# Patient Record
Sex: MALE | Race: WHITE | NOT HISPANIC OR LATINO
[De-identification: names, ages, dates, MRNs, and addresses within clinical notes are randomized per-mention and may not be internally consistent; named-entity substitution may affect disease eponyms.]

---

## 2018-09-18 PROBLEM — Z00.00 ENCOUNTER FOR PREVENTIVE HEALTH EXAMINATION: Status: ACTIVE | Noted: 2018-09-18

## 2018-10-09 ENCOUNTER — TRANSCRIPTION ENCOUNTER (OUTPATIENT)
Age: 38
End: 2018-10-09

## 2018-10-09 ENCOUNTER — APPOINTMENT (OUTPATIENT)
Dept: HUMAN REPRODUCTION | Facility: CLINIC | Age: 38
End: 2018-10-09
Payer: COMMERCIAL

## 2018-10-09 PROCEDURE — 89322 SEMEN ANAL STRICT CRITERIA: CPT

## 2018-10-20 ENCOUNTER — APPOINTMENT (OUTPATIENT)
Dept: HUMAN REPRODUCTION | Facility: CLINIC | Age: 38
End: 2018-10-20
Payer: COMMERCIAL

## 2018-10-20 PROCEDURE — 36415 COLL VENOUS BLD VENIPUNCTURE: CPT

## 2018-10-22 ENCOUNTER — APPOINTMENT (OUTPATIENT)
Dept: HUMAN REPRODUCTION | Facility: CLINIC | Age: 38
End: 2018-10-22
Payer: COMMERCIAL

## 2018-10-22 PROCEDURE — 89322 SEMEN ANAL STRICT CRITERIA: CPT

## 2018-11-12 ENCOUNTER — APPOINTMENT (OUTPATIENT)
Dept: HUMAN REPRODUCTION | Facility: CLINIC | Age: 38
End: 2018-11-12

## 2019-01-17 ENCOUNTER — APPOINTMENT (OUTPATIENT)
Dept: HUMAN REPRODUCTION | Facility: CLINIC | Age: 39
End: 2019-01-17
Payer: COMMERCIAL

## 2019-01-17 PROCEDURE — 89259 CRYOPRESERVATION SPERM: CPT

## 2019-01-17 PROCEDURE — 89343 STORAGE/YEAR SPERM/SEMEN: CPT

## 2019-01-17 PROCEDURE — 89322 SEMEN ANAL STRICT CRITERIA: CPT

## 2019-02-05 PROCEDURE — 89261 SPERM ISOLATION COMPLEX: CPT

## 2021-04-27 ENCOUNTER — APPOINTMENT (OUTPATIENT)
Dept: HUMAN REPRODUCTION | Facility: CLINIC | Age: 41
End: 2021-04-27

## 2021-09-02 ENCOUNTER — APPOINTMENT (OUTPATIENT)
Dept: HUMAN REPRODUCTION | Facility: CLINIC | Age: 41
End: 2021-09-02

## 2021-09-29 ENCOUNTER — APPOINTMENT (OUTPATIENT)
Dept: UROLOGY | Facility: CLINIC | Age: 41
End: 2021-09-29
Payer: COMMERCIAL

## 2021-09-29 ENCOUNTER — NON-APPOINTMENT (OUTPATIENT)
Age: 41
End: 2021-09-29

## 2021-09-29 VITALS
SYSTOLIC BLOOD PRESSURE: 126 MMHG | DIASTOLIC BLOOD PRESSURE: 87 MMHG | HEIGHT: 75 IN | WEIGHT: 280 LBS | BODY MASS INDEX: 34.82 KG/M2 | HEART RATE: 64 BPM | TEMPERATURE: 97.5 F

## 2021-09-29 DIAGNOSIS — Z78.9 OTHER SPECIFIED HEALTH STATUS: ICD-10-CM

## 2021-09-29 DIAGNOSIS — I10 ESSENTIAL (PRIMARY) HYPERTENSION: ICD-10-CM

## 2021-09-29 DIAGNOSIS — Z82.49 FAMILY HISTORY OF ISCHEMIC HEART DISEASE AND OTHER DISEASES OF THE CIRCULATORY SYSTEM: ICD-10-CM

## 2021-09-29 PROCEDURE — 99204 OFFICE O/P NEW MOD 45 MIN: CPT

## 2021-09-29 NOTE — ASSESSMENT
[FreeTextEntry1] : Plan\par testosterone\par LH\par FSH\par scrotal US\par repeat semen analysis\par fu 2 weeks

## 2021-09-29 NOTE — HISTORY OF PRESENT ILLNESS
[FreeTextEntry1] : Mr. JEFFERSON ZNEG 41 year old  M  PMH HTN and no PSH. Pt comes in bc him and his wife are going through IVF. Last IVF had a low sperm count but succeeded about 2 years ago. Pt currently trying to get pregnant again. Erections are good.\par \par 9/14/21 motility 0%, sperm count <2.0 mill/ml morphology 2%.  \par

## 2021-09-29 NOTE — PHYSICAL EXAM
[General Appearance - Well Developed] : well developed [General Appearance - Well Nourished] : well nourished [Normal Appearance] : normal appearance [Well Groomed] : well groomed [General Appearance - In No Acute Distress] : no acute distress [Edema] : no peripheral edema [Respiration, Rhythm And Depth] : normal respiratory rhythm and effort [Exaggerated Use Of Accessory Muscles For Inspiration] : no accessory muscle use [Abdomen Soft] : soft [Abdomen Tenderness] : non-tender [Costovertebral Angle Tenderness] : no ~M costovertebral angle tenderness [Urethral Meatus] : meatus normal [Penis Abnormality] : normal circumcised penis [Urinary Bladder Findings] : the bladder was normal on palpation [Scrotum] : the scrotum was normal [Epididymis] : the epididymides were normal [Testes Tenderness] : no tenderness of the testes [Testes Mass (___cm)] : there were no testicular masses [Normal Station and Gait] : the gait and station were normal for the patient's age [] : no rash [No Focal Deficits] : no focal deficits [Oriented To Time, Place, And Person] : oriented to person, place, and time [Affect] : the affect was normal [Mood] : the mood was normal [Not Anxious] : not anxious

## 2021-10-01 ENCOUNTER — APPOINTMENT (OUTPATIENT)
Dept: ULTRASOUND IMAGING | Facility: CLINIC | Age: 41
End: 2021-10-01
Payer: COMMERCIAL

## 2021-10-01 ENCOUNTER — TRANSCRIPTION ENCOUNTER (OUTPATIENT)
Age: 41
End: 2021-10-01

## 2021-10-01 PROCEDURE — 76870 US EXAM SCROTUM: CPT

## 2021-10-07 ENCOUNTER — APPOINTMENT (OUTPATIENT)
Dept: UROLOGY | Facility: CLINIC | Age: 41
End: 2021-10-07
Payer: COMMERCIAL

## 2021-10-07 VITALS
BODY MASS INDEX: 34.82 KG/M2 | DIASTOLIC BLOOD PRESSURE: 86 MMHG | WEIGHT: 280 LBS | TEMPERATURE: 97.3 F | HEART RATE: 61 BPM | HEIGHT: 75 IN | SYSTOLIC BLOOD PRESSURE: 122 MMHG

## 2021-10-07 PROCEDURE — 99214 OFFICE O/P EST MOD 30 MIN: CPT

## 2021-10-07 NOTE — HISTORY OF PRESENT ILLNESS
[FreeTextEntry1] : Mr. JEFFERSON ZENG 41 year old  M  PMH HTN and no PSH. Pt comes in bc him and his wife are going through IVF. Last IVF had a low sperm count but succeeded about 2 years ago. Pt currently trying to get pregnant again. Erections are good.\par \par 9/14/21 motility 0%, sperm count <2.0 mill/ml morphology 2%.  \par 9/30/21 mootility 0%, sperm count 16.5 mill/ml, morphology 2%  \par              FSH 5.3\par              LH 8.4\par              Testosterone 379\par \par

## 2021-10-11 ENCOUNTER — APPOINTMENT (OUTPATIENT)
Dept: UROLOGY | Facility: CLINIC | Age: 41
End: 2021-10-11
Payer: COMMERCIAL

## 2021-10-11 VITALS
RESPIRATION RATE: 16 BRPM | HEIGHT: 75 IN | WEIGHT: 283 LBS | DIASTOLIC BLOOD PRESSURE: 85 MMHG | BODY MASS INDEX: 35.19 KG/M2 | OXYGEN SATURATION: 97 % | SYSTOLIC BLOOD PRESSURE: 131 MMHG | HEART RATE: 64 BPM

## 2021-10-11 DIAGNOSIS — N46.11 ORGANIC OLIGOSPERMIA: ICD-10-CM

## 2021-10-11 PROCEDURE — 36415 COLL VENOUS BLD VENIPUNCTURE: CPT

## 2021-10-11 PROCEDURE — 93976 VASCULAR STUDY: CPT

## 2021-10-11 PROCEDURE — 99214 OFFICE O/P EST MOD 30 MIN: CPT

## 2021-10-11 PROCEDURE — 76870 US EXAM SCROTUM: CPT

## 2021-10-11 NOTE — HISTORY OF PRESENT ILLNESS
[FreeTextEntry1] : JEFFERSON ZENG, a 41 year old male, presented to the office with the chief complaint of male infertility. \par \par Wife 32 y/o \par -Seeing Dr. Jerry \par -one viable pregnancy 2 y/o boy via IVF - first attempt \par -last IVF  was chemical pregnancy - miscarriage \par - had only two embryos brandi genetic abberantis\par \par Had low sperm count in the past and borderline motility \par \par Recent semen analysis conducted 2 semen analyses - 9/14/21 & 9/29/21 - both showed zero motility. \par \par No erectile problems \par \par Stable weight \par \par works as an  at home

## 2021-10-11 NOTE — PHYSICAL EXAM
[General Appearance - Well Developed] : well developed [General Appearance - Well Nourished] : well nourished [Normal Appearance] : normal appearance [Well Groomed] : well groomed [General Appearance - In No Acute Distress] : no acute distress [Abdomen Soft] : soft [Abdomen Tenderness] : non-tender [Costovertebral Angle Tenderness] : no ~M costovertebral angle tenderness [] : no respiratory distress [Respiration, Rhythm And Depth] : normal respiratory rhythm and effort [Exaggerated Use Of Accessory Muscles For Inspiration] : no accessory muscle use [Oriented To Time, Place, And Person] : oriented to person, place, and time [Affect] : the affect was normal [Mood] : the mood was normal [Not Anxious] : not anxious [Normal Station and Gait] : the gait and station were normal for the patient's age [No Focal Deficits] : no focal deficits [No Palpable Adenopathy] : no palpable adenopathy [Urethral Meatus] : meatus normal [Penis Abnormality] : normal circumcised penis [FreeTextEntry1] : bilateral variocele L testicular atrophy (5cc); Right = 10cc

## 2021-10-11 NOTE — ASSESSMENT
[FreeTextEntry1] : Male infertility \par -2 semen analyses both show zero motility \par asthenoteratooligospermia \par \par expand hormonal eval \par cytogenetic, CF, YCMD - severe oligospermia \par if low T - consider arimidex - obese hence clomid may lead to increase in E level \par \par bilateral testis atrophy \par bilateral large varicocele on US done in office - reviewed by me \par pt is difficult to examine \par \par \par discussed timing of recovery of spermatogenesis after any surgical or medical intervention \par his wife is young hence reasonable to give them some time \par \par I would recommend repair of varicocele ro improve semen parameters\par \par This patient has varicocele. \par Presence of varicocele is associated with infertility, testis atrophy, low testosterone, testicular pain and erectile dysfunction.\par I have discussed effects of varicocele on different aspects of health and disease. \par I reviewed different treatment options including not performing any treatment at all.\par Surgical and non-surgical therapy was discussed. \par Patient wanted to proceed with microsurgical repair. \par The procedure itself, its risks including but not limited to bleeding, infection, wound dehiscence and issues, scar, postoperative pain, postoperative hydrocele, lack of improvement of semen parameters, lack of improvement of testosterone levels, and lack of improvement of other complaints was discussed. \par Risks of anesthesia including death was discussed. \par Patient understood risks, benefits, and alternatives to the surgery and wanted to proceed with surgery. \par All questions were answered. \par \par \par \par The submitted E/M billing level for this visit reflects the total time spent on the day of the visit including face-to-face time spent with the patient, non-face-to-face review of medical records and relevant information, documentation, and asynchronous communication with the patient after a visit via phone, email, or patient’s eHR portal after the visit.  \par \par The medical records reviewed are either scanned into the chart or reviewed with the patient using a patient’s electronic medical records portal for patients with records not available to [facility] via electronic transmission platforms from other institutions and labs. \par \par Time spend counseling and performing coordination of care was also included in determining the appropriate EM billing level.\par \par I have reviewed and verified information regarding the chief complaint and history recorded by the ancillary staff and/or the patient. I have independently reviewed and interpreted tests performed by other physicians and facilities as necessary. \par \par I have discussed with the patient differential diagnosis, reason for auxiliary tests if ordered, risks, benefits, alternatives, and complications of each form of therapy were discussed.

## 2021-10-13 LAB
ESTIMATED AVERAGE GLUCOSE: 117 MG/DL
HBA1C MFR BLD HPLC: 5.7 %

## 2021-10-21 ENCOUNTER — APPOINTMENT (OUTPATIENT)
Dept: UROLOGY | Facility: CLINIC | Age: 41
End: 2021-10-21

## 2021-10-21 DIAGNOSIS — R79.89 OTHER SPECIFIED ABNORMAL FINDINGS OF BLOOD CHEMISTRY: ICD-10-CM

## 2021-10-21 LAB
CFTR MUT TESTED BLD/T: NEGATIVE
INHIBIN B: 131.3 PG/ML
TESTOST BND SERPL-MCNC: 13.1 PG/ML
TESTOSTERONE TOTAL S: 225 NG/DL

## 2021-10-21 RX ORDER — ANASTROZOLE TABLETS 1 MG/1
1 TABLET ORAL DAILY
Qty: 1 | Refills: 6 | Status: ACTIVE | COMMUNITY
Start: 2021-10-21 | End: 1900-01-01

## 2021-10-21 NOTE — ASSESSMENT
[FreeTextEntry1] : arimidex send\par varic scheduled\par \par needs set an appointment in 4-5 weeks to check blood levels\par

## 2021-11-10 ENCOUNTER — OUTPATIENT (OUTPATIENT)
Dept: OUTPATIENT SERVICES | Facility: HOSPITAL | Age: 41
LOS: 1 days | End: 2021-11-10
Payer: COMMERCIAL

## 2021-11-10 VITALS
HEIGHT: 75 IN | DIASTOLIC BLOOD PRESSURE: 84 MMHG | OXYGEN SATURATION: 98 % | TEMPERATURE: 99 F | RESPIRATION RATE: 16 BRPM | SYSTOLIC BLOOD PRESSURE: 125 MMHG | WEIGHT: 287.04 LBS | HEART RATE: 76 BPM

## 2021-11-10 DIAGNOSIS — N46.11 ORGANIC OLIGOSPERMIA: ICD-10-CM

## 2021-11-10 DIAGNOSIS — N50.0 ATROPHY OF TESTIS: ICD-10-CM

## 2021-11-10 DIAGNOSIS — N46.9 MALE INFERTILITY, UNSPECIFIED: ICD-10-CM

## 2021-11-10 DIAGNOSIS — I86.1 SCROTAL VARICES: ICD-10-CM

## 2021-11-10 DIAGNOSIS — Z01.818 ENCOUNTER FOR OTHER PREPROCEDURAL EXAMINATION: ICD-10-CM

## 2021-11-10 LAB
ANION GAP SERPL CALC-SCNC: 14 MMOL/L — SIGNIFICANT CHANGE UP (ref 5–17)
BUN SERPL-MCNC: 16 MG/DL — SIGNIFICANT CHANGE UP (ref 7–23)
CALCIUM SERPL-MCNC: 10.3 MG/DL — SIGNIFICANT CHANGE UP (ref 8.4–10.5)
CHLORIDE SERPL-SCNC: 102 MMOL/L — SIGNIFICANT CHANGE UP (ref 96–108)
CO2 SERPL-SCNC: 26 MMOL/L — SIGNIFICANT CHANGE UP (ref 22–31)
CREAT SERPL-MCNC: 1.13 MG/DL — SIGNIFICANT CHANGE UP (ref 0.5–1.3)
GLUCOSE SERPL-MCNC: 93 MG/DL — SIGNIFICANT CHANGE UP (ref 70–99)
HCT VFR BLD CALC: 45.4 % — SIGNIFICANT CHANGE UP (ref 39–50)
HGB BLD-MCNC: 15.5 G/DL — SIGNIFICANT CHANGE UP (ref 13–17)
MCHC RBC-ENTMCNC: 28.7 PG — SIGNIFICANT CHANGE UP (ref 27–34)
MCHC RBC-ENTMCNC: 34.1 GM/DL — SIGNIFICANT CHANGE UP (ref 32–36)
MCV RBC AUTO: 83.9 FL — SIGNIFICANT CHANGE UP (ref 80–100)
NRBC # BLD: 0 /100 WBCS — SIGNIFICANT CHANGE UP (ref 0–0)
PLATELET # BLD AUTO: 310 K/UL — SIGNIFICANT CHANGE UP (ref 150–400)
POTASSIUM SERPL-MCNC: 4.2 MMOL/L — SIGNIFICANT CHANGE UP (ref 3.5–5.3)
POTASSIUM SERPL-SCNC: 4.2 MMOL/L — SIGNIFICANT CHANGE UP (ref 3.5–5.3)
RBC # BLD: 5.41 M/UL — SIGNIFICANT CHANGE UP (ref 4.2–5.8)
RBC # FLD: 13 % — SIGNIFICANT CHANGE UP (ref 10.3–14.5)
SODIUM SERPL-SCNC: 142 MMOL/L — SIGNIFICANT CHANGE UP (ref 135–145)
WBC # BLD: 7.63 K/UL — SIGNIFICANT CHANGE UP (ref 3.8–10.5)
WBC # FLD AUTO: 7.63 K/UL — SIGNIFICANT CHANGE UP (ref 3.8–10.5)

## 2021-11-10 PROCEDURE — 85027 COMPLETE CBC AUTOMATED: CPT

## 2021-11-10 PROCEDURE — 80048 BASIC METABOLIC PNL TOTAL CA: CPT

## 2021-11-10 PROCEDURE — G0463: CPT

## 2021-11-10 RX ORDER — LIDOCAINE HCL 20 MG/ML
0.2 VIAL (ML) INJECTION ONCE
Refills: 0 | Status: DISCONTINUED | OUTPATIENT
Start: 2021-11-24 | End: 2021-12-08

## 2021-11-10 RX ORDER — SODIUM CHLORIDE 9 MG/ML
3 INJECTION INTRAMUSCULAR; INTRAVENOUS; SUBCUTANEOUS EVERY 8 HOURS
Refills: 0 | Status: DISCONTINUED | OUTPATIENT
Start: 2021-11-24 | End: 2021-12-08

## 2021-11-10 NOTE — H&P PST ADULT - HISTORY OF PRESENT ILLNESS
41 yr old Obese (BMI: 35.9  ) male with no pertinent medical or surgical hx. Pt reports he and wife are trying to conceive via IVF, and decrease sperm motility was discovered. Pt was evaluated by Dr. Pearl for a scheduled Bilateral microsurgical varicocele repair on 11/24/21. Pt denies scrotal pain, or ejaculation issues. Pt denies recent travel or sick contact.    **Covid swab 11/21/21 [facility] 41 yr old Obese (BMI: 35.9  ) male with no pertinent medical or surgical hx. Pt reports he and wife are trying to conceive via IVF, and decrease sperm motility was discovered. Pt was evaluated by Dr. Pearl for a scheduled Bilateral microsurgical varicocele repair on 11/24/21. Pt denies scrotal pain, or ejaculation dysfunction. Pt denies recent travel or sick contact.    **Covid swab 11/21/21 [facility]

## 2021-11-10 NOTE — H&P PST ADULT - RS GEN PE MLT RESP DETAILS PC
normal/airway patent/good air movement/respirations non-labored/clear to auscultation bilaterally/no rales/no rhonchi/no wheezes

## 2021-11-10 NOTE — H&P PST ADULT - NSANTHOSAYNRD_GEN_A_CORE
19 inches/No. GABRIELA screening performed.  STOP BANG Legend: 0-2 = LOW Risk; 3-4 = INTERMEDIATE Risk; 5-8 = HIGH Risk

## 2021-11-10 NOTE — H&P PST ADULT - PROBLEM SELECTOR PLAN 1
No -scheduled microsurgical varicocele repair  -preop instructions given  -Labs: CBC, BMP done in PST

## 2021-11-13 PROBLEM — F41.9 ANXIETY DISORDER, UNSPECIFIED: Chronic | Status: ACTIVE | Noted: 2021-11-10

## 2021-11-13 PROBLEM — Z86.79 PERSONAL HISTORY OF OTHER DISEASES OF THE CIRCULATORY SYSTEM: Chronic | Status: ACTIVE | Noted: 2021-11-10

## 2021-11-13 PROBLEM — E66.9 OBESITY, UNSPECIFIED: Chronic | Status: ACTIVE | Noted: 2021-11-10

## 2021-11-21 ENCOUNTER — OUTPATIENT (OUTPATIENT)
Dept: OUTPATIENT SERVICES | Facility: HOSPITAL | Age: 41
LOS: 1 days | End: 2021-11-21
Payer: COMMERCIAL

## 2021-11-21 DIAGNOSIS — Z11.52 ENCOUNTER FOR SCREENING FOR COVID-19: ICD-10-CM

## 2021-11-21 LAB — SARS-COV-2 RNA SPEC QL NAA+PROBE: SIGNIFICANT CHANGE UP

## 2021-11-21 PROCEDURE — C9803: CPT

## 2021-11-21 PROCEDURE — U0005: CPT

## 2021-11-21 PROCEDURE — U0003: CPT

## 2021-11-23 ENCOUNTER — TRANSCRIPTION ENCOUNTER (OUTPATIENT)
Age: 41
End: 2021-11-23

## 2021-11-24 ENCOUNTER — OUTPATIENT (OUTPATIENT)
Dept: OUTPATIENT SERVICES | Facility: HOSPITAL | Age: 41
LOS: 1 days | End: 2021-11-24
Payer: COMMERCIAL

## 2021-11-24 ENCOUNTER — APPOINTMENT (OUTPATIENT)
Dept: UROLOGY | Facility: HOSPITAL | Age: 41
End: 2021-11-24

## 2021-11-24 VITALS
OXYGEN SATURATION: 97 % | HEART RATE: 80 BPM | DIASTOLIC BLOOD PRESSURE: 62 MMHG | TEMPERATURE: 98 F | SYSTOLIC BLOOD PRESSURE: 107 MMHG | RESPIRATION RATE: 17 BRPM

## 2021-11-24 VITALS
SYSTOLIC BLOOD PRESSURE: 138 MMHG | OXYGEN SATURATION: 99 % | HEART RATE: 72 BPM | RESPIRATION RATE: 18 BRPM | WEIGHT: 287.04 LBS | TEMPERATURE: 97 F | DIASTOLIC BLOOD PRESSURE: 86 MMHG | HEIGHT: 75 IN

## 2021-11-24 DIAGNOSIS — N50.0 ATROPHY OF TESTIS: ICD-10-CM

## 2021-11-24 DIAGNOSIS — I86.1 SCROTAL VARICES: ICD-10-CM

## 2021-11-24 DIAGNOSIS — N46.11 ORGANIC OLIGOSPERMIA: ICD-10-CM

## 2021-11-24 DIAGNOSIS — N46.9 MALE INFERTILITY, UNSPECIFIED: ICD-10-CM

## 2021-11-24 PROCEDURE — 55535 REVISE SPERMATIC CORD VEINS: CPT | Mod: 50

## 2021-11-24 PROCEDURE — C9399: CPT

## 2021-11-24 PROCEDURE — C1889: CPT

## 2021-11-24 RX ORDER — FENTANYL CITRATE 50 UG/ML
25 INJECTION INTRAVENOUS
Refills: 0 | Status: DISCONTINUED | OUTPATIENT
Start: 2021-11-24 | End: 2021-11-24

## 2021-11-24 RX ORDER — SODIUM CHLORIDE 9 MG/ML
1000 INJECTION, SOLUTION INTRAVENOUS
Refills: 0 | Status: DISCONTINUED | OUTPATIENT
Start: 2021-11-24 | End: 2021-12-08

## 2021-11-24 RX ORDER — ANASTROZOLE 1 MG/1
1 TABLET ORAL
Qty: 0 | Refills: 0 | DISCHARGE

## 2021-11-24 RX ORDER — ONDANSETRON 8 MG/1
4 TABLET, FILM COATED ORAL ONCE
Refills: 0 | Status: DISCONTINUED | OUTPATIENT
Start: 2021-11-24 | End: 2021-12-08

## 2021-11-24 RX ORDER — CELECOXIB 200 MG/1
1 CAPSULE ORAL
Qty: 10 | Refills: 0
Start: 2021-11-24 | End: 2021-11-28

## 2021-11-24 NOTE — ASU DISCHARGE PLAN (ADULT/PEDIATRIC) - CARE PROVIDER_API CALL
Peng Pearl; PhD)  Urology  [address] [facility], [facility] [[email or web address]] [address]  [place] [address]  Phone: [phone]  Fax: [phone]  Follow Up Time:

## 2021-11-26 ENCOUNTER — NON-APPOINTMENT (OUTPATIENT)
Age: 41
End: 2021-11-26

## 2021-12-23 ENCOUNTER — APPOINTMENT (OUTPATIENT)
Dept: UROLOGY | Facility: CLINIC | Age: 41
End: 2021-12-23
Payer: COMMERCIAL

## 2021-12-23 VITALS
HEART RATE: 65 BPM | RESPIRATION RATE: 16 BRPM | OXYGEN SATURATION: 97 % | SYSTOLIC BLOOD PRESSURE: 132 MMHG | DIASTOLIC BLOOD PRESSURE: 89 MMHG

## 2021-12-23 DIAGNOSIS — Z98.890 OTHER SPECIFIED POSTPROCEDURAL STATES: ICD-10-CM

## 2021-12-23 DIAGNOSIS — N46.9 MALE INFERTILITY, UNSPECIFIED: ICD-10-CM

## 2021-12-23 DIAGNOSIS — G89.18 OTHER ACUTE POSTPROCEDURAL PAIN: ICD-10-CM

## 2021-12-23 DIAGNOSIS — I86.1 SCROTAL VARICES: ICD-10-CM

## 2021-12-23 DIAGNOSIS — N50.0 ATROPHY OF TESTIS: ICD-10-CM

## 2021-12-23 DIAGNOSIS — Z86.79 OTHER SPECIFIED POSTPROCEDURAL STATES: ICD-10-CM

## 2021-12-23 PROCEDURE — 99024 POSTOP FOLLOW-UP VISIT: CPT

## 2021-12-23 PROCEDURE — 36415 COLL VENOUS BLD VENIPUNCTURE: CPT

## 2021-12-23 RX ORDER — PENTOXIFYLLINE 400 MG/1
400 TABLET, EXTENDED RELEASE ORAL TWICE DAILY
Qty: 60 | Refills: 11 | Status: ACTIVE | COMMUNITY
Start: 2021-12-23 | End: 1900-01-01

## 2021-12-23 RX ORDER — HYDROCODONE BITARTRATE AND ACETAMINOPHEN 5; 325 MG/1; MG/1
5-325 TABLET ORAL
Qty: 8 | Refills: 0 | Status: COMPLETED | COMMUNITY
Start: 2021-11-24 | End: 2021-12-23

## 2021-12-23 RX ORDER — CELECOXIB 200 MG/1
200 CAPSULE ORAL
Qty: 10 | Refills: 0 | Status: COMPLETED | COMMUNITY
Start: 2021-11-24 | End: 2021-12-23

## 2021-12-23 NOTE — HISTORY OF PRESENT ILLNESS
[FreeTextEntry1] : JEFFERSON ZENG, a 41 year old male, presented to the office for a post op follow up for bilateral varicocele repair done on 11/24/21.\par \par Taking anastrozole 1mg daily\par \par No post op pain\par \par Some positional pain very rarely\par \par Poor motility \par \par increased sperm density

## 2021-12-23 NOTE — ASSESSMENT
[FreeTextEntry1] : Bilateral varicocele repair done 11/24/21\par \par Reports no pain - some positional pain at times.\par \par on Arimidex need to check T and LH and FSH\par \par last semen analysis showed better density but no motility \par \par explained timing of motility\par \par continue Arimidex\par \par try Trental empirically \par \par The submitted E/M billing level for this visit reflects the total time spent on the day of the visit including documentation in EMR, face-to-face time spent with the patient, non-face-to-face review of medical records and relevant information, review of laboratory results available via [employer] portal, as well using a patient’s electronic medical records portal for patients with records not available to [facility] directly. \par \par Time spend counseling and performing coordination of care was also included in determining the appropriate EM billing level.\par \par I have reviewed and verified information regarding the chief complaint and history recorded by the ancillary staff and/or the patient. I have independently reviewed and interpreted tests performed by other physicians and facilities as necessary. I have reviewed images pertinent to providing the care to  the patient.  \par \par I have discussed with the patient differential diagnosis, reason for auxiliary tests if ordered, risks, benefits, alternatives, and complications of each form of therapy included surgical therapy. Off label use of most of medications used in andrology was reviewed. \par \par \par

## 2022-01-27 ENCOUNTER — APPOINTMENT (OUTPATIENT)
Dept: HUMAN REPRODUCTION | Facility: CLINIC | Age: 42
End: 2022-01-27
Payer: COMMERCIAL

## 2022-01-27 PROCEDURE — 89322 SEMEN ANAL STRICT CRITERIA: CPT

## 2022-02-18 ENCOUNTER — APPOINTMENT (OUTPATIENT)
Dept: HUMAN REPRODUCTION | Facility: CLINIC | Age: 42
End: 2022-02-18

## 2022-11-30 LAB
ALBUMIN SERPL ELPH-MCNC: 4.7 G/DL
ALP BLD-CCNC: 48 U/L
ALT SERPL-CCNC: 38 U/L
ANION GAP SERPL CALC-SCNC: 13 MMOL/L
AST SERPL-CCNC: 24 U/L
BASOPHILS # BLD AUTO: 0.06 K/UL
BASOPHILS NFR BLD AUTO: 1 %
BILIRUB SERPL-MCNC: 0.4 MG/DL
BUN SERPL-MCNC: 14 MG/DL
CALCIUM SERPL-MCNC: 10.2 MG/DL
CHLORIDE SERPL-SCNC: 102 MMOL/L
CO2 SERPL-SCNC: 28 MMOL/L
CREAT SERPL-MCNC: 1.14 MG/DL
EOSINOPHIL # BLD AUTO: 0.16 K/UL
EOSINOPHIL NFR BLD AUTO: 2.6 %
ESTRADIOL SERPL-MCNC: 21 PG/ML
FSH SERPL-MCNC: 4.2 IU/L
GLUCOSE SERPL-MCNC: 170 MG/DL
HCT VFR BLD CALC: 46.9 %
HGB BLD-MCNC: 15.9 G/DL
IMM GRANULOCYTES NFR BLD AUTO: 0.2 %
LH SERPL-ACNC: 6 IU/L
LYMPHOCYTES # BLD AUTO: 2.16 K/UL
LYMPHOCYTES NFR BLD AUTO: 35.2 %
MAN DIFF?: NORMAL
MCHC RBC-ENTMCNC: 29.1 PG
MCHC RBC-ENTMCNC: 33.9 GM/DL
MCV RBC AUTO: 85.9 FL
MONOCYTES # BLD AUTO: 0.42 K/UL
MONOCYTES NFR BLD AUTO: 6.9 %
NEUTROPHILS # BLD AUTO: 3.32 K/UL
NEUTROPHILS NFR BLD AUTO: 54.1 %
PLATELET # BLD AUTO: 282 K/UL
POTASSIUM SERPL-SCNC: 4.1 MMOL/L
PROLACTIN SERPL-MCNC: 15.4 NG/ML
PROT SERPL-MCNC: 7.8 G/DL
RBC # BLD: 5.46 M/UL
RBC # FLD: 13.4 %
SODIUM SERPL-SCNC: 142 MMOL/L
TSH SERPL-ACNC: 1.7 UIU/ML
WBC # FLD AUTO: 6.13 K/UL
Y CHROMOSOME MICRODELETION, DNA ANALYSIS: NORMAL

## 2023-07-13 NOTE — H&P PST ADULT - PRO ARRIVE FROM
home Composite Graft Text: The defect edges were debeveled with a #15 scalpel blade.  Given the location of the defect, shape of the defect, the proximity to free margins and the fact the defect was full thickness a composite graft was deemed most appropriate.  The defect was outline and then transferred to the donor site.  A full thickness graft was then excised from the donor site. The graft was then placed in the primary defect, oriented appropriately and then sutured into place.  The secondary defect was then repaired using a primary closure.

## 2024-04-26 ENCOUNTER — NON-APPOINTMENT (OUTPATIENT)
Age: 44
End: 2024-04-26